# Patient Record
Sex: FEMALE | Race: WHITE | HISPANIC OR LATINO | URBAN - METROPOLITAN AREA
[De-identification: names, ages, dates, MRNs, and addresses within clinical notes are randomized per-mention and may not be internally consistent; named-entity substitution may affect disease eponyms.]

---

## 2018-10-11 ENCOUNTER — EMERGENCY (EMERGENCY)
Facility: HOSPITAL | Age: 43
LOS: 1 days | End: 2018-10-11
Payer: COMMERCIAL

## 2018-10-11 PROCEDURE — 72125 CT NECK SPINE W/O DYE: CPT | Mod: 26

## 2018-10-11 PROCEDURE — 73030 X-RAY EXAM OF SHOULDER: CPT | Mod: 26,RT

## 2018-10-11 PROCEDURE — 99284 EMERGENCY DEPT VISIT MOD MDM: CPT

## 2022-02-10 ENCOUNTER — EMERGENCY (EMERGENCY)
Facility: HOSPITAL | Age: 47
LOS: 1 days | Discharge: HOME | End: 2022-02-10
Admitting: EMERGENCY MEDICINE
Payer: COMMERCIAL

## 2022-02-10 DIAGNOSIS — R11.0 NAUSEA: ICD-10-CM

## 2022-02-10 DIAGNOSIS — R42 DIZZINESS AND GIDDINESS: ICD-10-CM

## 2022-02-10 DIAGNOSIS — M54.2 CERVICALGIA: ICD-10-CM

## 2022-02-10 PROCEDURE — 99284 EMERGENCY DEPT VISIT MOD MDM: CPT

## 2022-02-10 PROCEDURE — 93010 ELECTROCARDIOGRAM REPORT: CPT

## 2022-07-20 PROBLEM — Z00.00 ENCOUNTER FOR PREVENTIVE HEALTH EXAMINATION: Status: ACTIVE | Noted: 2022-07-20

## 2022-07-28 ENCOUNTER — NON-APPOINTMENT (OUTPATIENT)
Age: 47
End: 2022-07-28

## 2022-09-01 ENCOUNTER — TRANSCRIPTION ENCOUNTER (OUTPATIENT)
Age: 47
End: 2022-09-01

## 2022-09-01 ENCOUNTER — LABORATORY RESULT (OUTPATIENT)
Age: 47
End: 2022-09-01

## 2022-09-01 ENCOUNTER — NON-APPOINTMENT (OUTPATIENT)
Age: 47
End: 2022-09-01

## 2022-09-01 ENCOUNTER — APPOINTMENT (OUTPATIENT)
Dept: RHEUMATOLOGY | Facility: CLINIC | Age: 47
End: 2022-09-01

## 2022-09-01 VITALS
RESPIRATION RATE: 18 BRPM | SYSTOLIC BLOOD PRESSURE: 118 MMHG | WEIGHT: 125 LBS | BODY MASS INDEX: 24.54 KG/M2 | HEART RATE: 84 BPM | HEIGHT: 60 IN | DIASTOLIC BLOOD PRESSURE: 83 MMHG | OXYGEN SATURATION: 100 %

## 2022-09-01 PROCEDURE — 99204 OFFICE O/P NEW MOD 45 MIN: CPT | Mod: 25

## 2022-09-01 PROCEDURE — 36415 COLL VENOUS BLD VENIPUNCTURE: CPT

## 2022-09-01 NOTE — HISTORY OF PRESENT ILLNESS
[FreeTextEntry1] : 48 y/o female w/ Hashimoto's referred to rheumatology for evaluation for SLE in setting of joint pains and positive JAMEEL. \par Pt reports recurrent joint pains worst b/l shoulders, knees, hands. Reports some recurrent swelling of hands. Denies other joint swelling, redness, warmth.\par Reports photosensitivity with rashes of head and b/l arms. Pt has possible eczema of back.\par Reports fatigue over many years.\par Pt had history cipro allergy with subserquent joint pains. Pt was seen by Dr. Mckoy x 1 year and told she has mild lupus was on HCQ for 1 year (almost 10 years ago, thinks it helped) but when she was seen by a different rheumatologist, she was told she does not have lupus and taken off the medication.\par Reports recurrent painful oral ulcers recently.\par Cough since COVID infection. Reports generalized alopecia. Dry eyes, uses eye drops.\par \par Patient denies fever, chest pain, abdominal pain, cough, SOB, nausea, vomiting, diarrhea, blood in stool, hematuria, Raynaud's, dry mouth, numbness/tingling, miscarriages (3 children), Hx of DVT/PEs.\par ROS negative unless otherwise noted above.\par \par Maternal side - thyroid diseases\par \par WORKUP: \par Remarkable for (5/2022): WBC 3.4, JAMEEL 1:80 nuclear/speckled, RF 43, Vit D 25-OH 11 \par Normal/neg (5/2022): CMP, U/A, ESR, CRP, CCP, uric acid 3.4, B12, folate, TSH, HgbA1C, Lyme\par

## 2022-09-01 NOTE — ASSESSMENT
[FreeTextEntry1] : 46 y/o female w/ Hashimoto's referred to rheumatology for evaluation for SLE in setting of joint pains and positive JAMEEL. \par Pt reports recurrent joint pains worst b/l shoulders, knees, hands. Reports some recurrent swelling of hands. Denies other joint swelling, redness, warmth.\par Reports photosensitivity with rashes of head and b/l arms. Pt has possible eczema of back.\par Reports fatigue over many years.\par Pt had history cipro allergy with subserquent joint pains. Pt was seen by Dr. Mckoy x 1 year and told she has mild lupus was on HCQ for 1 year (almost 10 years ago, thinks it helped) but when she was seen by a different rheumatologist, she was told she does not have lupus and taken off the medication.\par Reports recurrent painful oral ulcers recently.\par Cough since COVID infection. Reports generalized alopecia. Dry eyes, uses eye drops.\par Maternal side - thyroid diseases\par Labwork by PCP in 5/2022 with WBC 3.4, JAMEEL 1:80 nuclear/speckled, RF 43, low Vit D (supplemented). Negative inflammatory markers, CCP\par \par Patient has minimal positive JAMEEL (in setting of Hashimoto's), minimal leukopenia, elevated RF, with joint pains (possible inflammatory character), photosensitive rashes, recurrent oral ulcers.\par Although JAMEEL can be explained by Hashimoto's, pt's constellation of symptoms would meet criteria for SLE. Pt likely has mild lupus (only joint and skin involvement for ~10 years).\par Discussed with patient that lupus treatment is based on severity and level of organ involvement. Based on pt's mild lupus, pt would likely benefit from restarting HCQ. More aggressive treatment would only be indicated if there are more serious manifestations of SLE.\par \par - Obtain labwork to evaluate and characterize SLE, medication safety\par - After results, will likely start HCQ 200mg PO daily. HCQ dosage is <5mg/kg/day or <400mg/day to minimize risk of retinal toxicity.\par Side effects of HCQ were discussed with the patient including but not limited to GI upset, retinal toxicity, QT prolongation, cytopenias, myopathy. Discussed the importance of yearly ophthalmology evaluation.\par - Refer to ophthalmology for HCQ follow ups\par - Will call pt with results to likely start HCQ. RTC 2.5 months.\par \par \par

## 2022-09-02 LAB
ALBUMIN SERPL ELPH-MCNC: 4.7 G/DL
ALP BLD-CCNC: 84 U/L
ALT SERPL-CCNC: 10 U/L
ANION GAP SERPL CALC-SCNC: 12 MMOL/L
APPEARANCE: ABNORMAL
AST SERPL-CCNC: 20 U/L
BASOPHILS # BLD AUTO: 0.06 K/UL
BASOPHILS NFR BLD AUTO: 1.4 %
BILIRUB SERPL-MCNC: 0.3 MG/DL
BILIRUBIN URINE: NEGATIVE
BLOOD URINE: NEGATIVE
BUN SERPL-MCNC: 9 MG/DL
C3 SERPL-MCNC: 131 MG/DL
C4 SERPL-MCNC: 19 MG/DL
CALCIUM SERPL-MCNC: 9.3 MG/DL
CHLORIDE SERPL-SCNC: 105 MMOL/L
CK SERPL-CCNC: 55 U/L
CO2 SERPL-SCNC: 22 MMOL/L
COLOR: YELLOW
CREAT SERPL-MCNC: 0.68 MG/DL
CREAT SPEC-SCNC: 216 MG/DL
CREAT/PROT UR: 0.1 RATIO
CRP SERPL-MCNC: <3 MG/L
EGFR: 109 ML/MIN/1.73M2
ENA RNP AB SER IA-ACNC: <0.2 AL
ENA SM AB SER IA-ACNC: <0.2 AL
ENA SS-A AB SER IA-ACNC: <0.2 AL
ENA SS-B AB SER IA-ACNC: <0.2 AL
EOSINOPHIL # BLD AUTO: 0.15 K/UL
EOSINOPHIL NFR BLD AUTO: 3.6 %
ERYTHROCYTE [SEDIMENTATION RATE] IN BLOOD BY WESTERGREN METHOD: 6 MM/HR
GLUCOSE QUALITATIVE U: NEGATIVE
GLUCOSE SERPL-MCNC: 91 MG/DL
HBV CORE IGG+IGM SER QL: NONREACTIVE
HBV SURFACE AB SER QL: REACTIVE
HBV SURFACE AG SER QL: NONREACTIVE
HCT VFR BLD CALC: 40.9 %
HCV AB SER QL: NONREACTIVE
HCV S/CO RATIO: 0.11 S/CO
HGB BLD-MCNC: 13.6 G/DL
IMM GRANULOCYTES NFR BLD AUTO: 0.2 %
KETONES URINE: NEGATIVE
LEUKOCYTE ESTERASE URINE: NEGATIVE
LYMPHOCYTES # BLD AUTO: 0.99 K/UL
LYMPHOCYTES NFR BLD AUTO: 23.6 %
MAN DIFF?: NORMAL
MCHC RBC-ENTMCNC: 30 PG
MCHC RBC-ENTMCNC: 33.3 GM/DL
MCV RBC AUTO: 90.1 FL
MONOCYTES # BLD AUTO: 0.33 K/UL
MONOCYTES NFR BLD AUTO: 7.9 %
NEUTROPHILS # BLD AUTO: 2.65 K/UL
NEUTROPHILS NFR BLD AUTO: 63.3 %
NITRITE URINE: NEGATIVE
PH URINE: 6
PLATELET # BLD AUTO: 236 K/UL
POTASSIUM SERPL-SCNC: 4.3 MMOL/L
PROT SERPL-MCNC: 7.5 G/DL
PROT UR-MCNC: 15 MG/DL
PROTEIN URINE: NORMAL
RBC # BLD: 4.54 M/UL
RBC # FLD: 13.5 %
SODIUM SERPL-SCNC: 138 MMOL/L
SPECIFIC GRAVITY URINE: 1.02
UROBILINOGEN URINE: NORMAL
WBC # FLD AUTO: 4.19 K/UL

## 2022-09-04 LAB
DSDNA AB SER-ACNC: 13 IU/ML
THYROGLOB AB SERPL-ACNC: <20 IU/ML
THYROPEROXIDASE AB SERPL IA-ACNC: 1147 IU/ML

## 2022-09-06 LAB
ANA PAT FLD IF-IMP: ABNORMAL
ANA SER IF-ACNC: ABNORMAL
M TB IFN-G BLD-IMP: NEGATIVE
QUANTIFERON TB PLUS MITOGEN MINUS NIL: >10 IU/ML
QUANTIFERON TB PLUS NIL: 0.01 IU/ML
QUANTIFERON TB PLUS TB1 MINUS NIL: 0 IU/ML
QUANTIFERON TB PLUS TB2 MINUS NIL: 0 IU/ML

## 2022-09-12 LAB — 14-3-3 ETA AG SER IA-MCNC: <0.2 NG/ML

## 2022-10-17 ENCOUNTER — NON-APPOINTMENT (OUTPATIENT)
Age: 47
End: 2022-10-17

## 2022-10-19 ENCOUNTER — NON-APPOINTMENT (OUTPATIENT)
Age: 47
End: 2022-10-19

## 2022-10-19 ENCOUNTER — APPOINTMENT (OUTPATIENT)
Dept: OPHTHALMOLOGY | Facility: CLINIC | Age: 47
End: 2022-10-19

## 2022-10-19 PROCEDURE — 92015 DETERMINE REFRACTIVE STATE: CPT

## 2022-10-19 PROCEDURE — 76514 ECHO EXAM OF EYE THICKNESS: CPT

## 2022-10-19 PROCEDURE — 92134 CPTRZ OPH DX IMG PST SGM RTA: CPT

## 2022-10-19 PROCEDURE — 92004 COMPRE OPH EXAM NEW PT 1/>: CPT

## 2022-11-02 ENCOUNTER — APPOINTMENT (OUTPATIENT)
Dept: RHEUMATOLOGY | Facility: CLINIC | Age: 47
End: 2022-11-02

## 2022-11-02 VITALS
HEART RATE: 83 BPM | SYSTOLIC BLOOD PRESSURE: 127 MMHG | OXYGEN SATURATION: 100 % | HEIGHT: 60 IN | BODY MASS INDEX: 24.74 KG/M2 | WEIGHT: 126 LBS | DIASTOLIC BLOOD PRESSURE: 84 MMHG

## 2022-11-02 PROCEDURE — 99214 OFFICE O/P EST MOD 30 MIN: CPT

## 2022-11-02 NOTE — ASSESSMENT
[FreeTextEntry1] : 48 y/o female w/ Hashimoto's presents as follow up for SLE.\par Pt reports recurrent joint pains worst b/l shoulders, knees, hands. Reports some recurrent swelling of hands. Denies other joint swelling, redness, warmth. \par Reports photosensitivity with rashes of head and b/l arms. Pt has possible eczema of back. \par Reports fatigue over many years. \par Pt had history cipro allergy with subserquent joint pains. Pt was seen by Dr. Mckoy x 1 year and told she has mild lupus was on HCQ for 1 year (almost 10 years ago, thinks it helped) but when she was seen by a different rheumatologist, she was told she does not have lupus and taken off the medication. \par Reports recurrent painful oral ulcers recently. \par Cough since COVID infection. Reports generalized alopecia. Dry eyes, uses eye drops. \par Maternal side - thyroid diseases \par \par Labwork with positive JAMEEL, inflammatory arthritis, oral ulcers, photosensitive rash, Hx of mild leukopenia – pt meets criteria for SLE without organ involvement. Pt also has TPO Ab+ in setting of know Hashimoto’s. Pt was started on HCQ by me on 9/2022 with improvement in joint pains. Pt also takes celebrex PRN for pain with good improvement.\par \par - Will obtain lupus activity labwork every ~6 months\par - c/w HCQ 200mg PO daily. HCQ dosage is <5mg/kg/day or <400mg/day to minimize risk of retinal toxicity. \par Side effects of HCQ were discussed with the patient including but not limited to GI upset, retinal toxicity, QT prolongation, cytopenias, myopathy. Discussed the importance of yearly ophthalmology evaluation. \par - Last seen by ophthalmology 10/2022\par - RTC 3 months for labwork, then every 6 months if stable. \par

## 2022-11-02 NOTE — HISTORY OF PRESENT ILLNESS
[FreeTextEntry1] : HISTORY: \par 46 y/o female w/ Hashimoto's presents as follow up for SLE.\par Pt reports recurrent joint pains worst b/l shoulders, knees, hands. Reports some recurrent swelling of hands. Denies other joint swelling, redness, warmth. \par Reports photosensitivity with rashes of head and b/l arms. Pt has possible eczema of back. \par Reports fatigue over many years. \par Pt had history cipro allergy with subserquent joint pains. Pt was seen by Dr. Mckoy x 1 year and told she has mild lupus was on HCQ for 1 year (almost 10 years ago, thinks it helped) but when she was seen by a different rheumatologist, she was told she does not have lupus and taken off the medication. \par Reports recurrent painful oral ulcers recently. \par Cough since COVID infection. Reports generalized alopecia. Dry eyes, uses eye drops. \par Maternal side - thyroid diseases \par Labwork by PCP in 5/2022 with WBC 3.4, JAMEEL 1:80 nuclear/speckled, RF 43, low Vit D (supplemented). Negative inflammatory markers, CCP \par \par INTERVAL HISTORY: \par Labwork with positive JAMEEL, inflammatory arthritis, oral ulcers, photosensitive rash, Hx of mild leukopenia – pt meets criteria for SLE without organ involvement. Pt also has TPO Ab+ in setting of know Hashimoto’s. Pt was started on HCQ by me on 9/2022. \par Pt was last seen by ophthalmology for HCQ screening on 10/2022. Pt also had punctal plugs for dry eyes.\par Pt reports HCQ helps with joint pains overall and takes celebrex PRN for the pains with good improvement in pains.\par Denies any side effects.\par Reports continuing occasional oral ulcers and possible nasal ulcers.\par \par WORKUP:  \par Remarkable for (5/2022 - 9/2022): JAMEEL 1:320 homogeneous, TPO Ab+, RF 43 \par Normal/neg (5/2022 - 9/2022): CBC, CMP, U/A, UPCR, ESR, CRP, dsDNA, SSA, SSB, Smith, RNP, C3, C4, APLS labs, CCP, 14.3.3 eta protein, uric acid 3.4, CPK, B12, folate, TSH, HgbA1C, Lyme, Hep B/C, Quantiferon TB\par

## 2023-02-02 ENCOUNTER — LABORATORY RESULT (OUTPATIENT)
Age: 48
End: 2023-02-02

## 2023-02-02 ENCOUNTER — APPOINTMENT (OUTPATIENT)
Dept: RHEUMATOLOGY | Facility: CLINIC | Age: 48
End: 2023-02-02
Payer: COMMERCIAL

## 2023-02-02 VITALS
OXYGEN SATURATION: 100 % | RESPIRATION RATE: 18 BRPM | TEMPERATURE: 97.2 F | SYSTOLIC BLOOD PRESSURE: 113 MMHG | DIASTOLIC BLOOD PRESSURE: 78 MMHG | HEART RATE: 92 BPM

## 2023-02-02 DIAGNOSIS — K13.79 OTHER LESIONS OF ORAL MUCOSA: ICD-10-CM

## 2023-02-02 PROCEDURE — 99214 OFFICE O/P EST MOD 30 MIN: CPT | Mod: 25

## 2023-02-02 PROCEDURE — 36415 COLL VENOUS BLD VENIPUNCTURE: CPT

## 2023-02-02 NOTE — HISTORY OF PRESENT ILLNESS
[FreeTextEntry1] : HISTORY: \par 48 y/o female w/ Hashimoto's presents as follow up for SLE. \par Pt reports recurrent joint pains worst b/l shoulders, knees, hands. Reports some recurrent swelling of hands. Denies other joint swelling, redness, warmth.  \par Reports photosensitivity with rashes of head and b/l arms. Pt has possible eczema of back.  \par Reports fatigue over many years.  \par Pt had history cipro allergy with subsequent joint pains. Pt was seen by Dr. Mckoy x 1 year and told she has mild lupus was on HCQ for 1 year (almost 10 years ago, thinks it helped) but when she was seen by a different rheumatologist, she was told she does not have lupus and taken off the medication.  \par Reports recurrent painful oral ulcers recently.  \par Cough since COVID infection. Reports generalized alopecia. Dry eyes, uses eye drops.  \par Maternal side - thyroid diseases  \par \par Labwork with positive JAMEEL, inflammatory arthritis, oral ulcers, photosensitive rash, Hx of mild leukopenia – pt meets criteria for SLE without organ involvement. Pt also has TPO Ab+ in setting of known Hashimoto’s. Pt was started on HCQ by me on 9/2022 with improvement in joint pains. Pt also takes celebrex PRN for pain with good improvement. \par \par INTERVAL HISTORY: \par Pt has felt generally well - pt has been having more significant joint pains over the last 2 weeks for which she takes celebrex PRN. Pt continues to have recurrent oral ulcers that have improved since starting HCQ.\par Denies other concerns today.\par \par WORKUP:  \par Remarkable for (5/2022 - 9/2022): JAMEEL 1:320 homogeneous, TPO Ab+, RF 43  \par Normal/neg (5/2022 - 9/2022): CBC, CMP, U/A, UPCR, ESR, CRP, dsDNA, SSA, SSB, Smith, RNP, C3, C4, APLS labs, CCP, 14.3.3 eta protein, uric acid 3.4, CPK, B12, folate, TSH, HgbA1C, Lyme, Hep B/C, Quantiferon TB

## 2023-02-02 NOTE — ASSESSMENT
[FreeTextEntry1] : 46 y/o female w/ Hashimoto's presents as follow up for SLE. \par Pt reports recurrent joint pains worst b/l shoulders, knees, hands. Reports some recurrent swelling of hands. Denies other joint swelling, redness, warmth.  \par Reports photosensitivity with rashes of head and b/l arms. Pt has possible eczema of back.  \par Reports fatigue over many years.  \par Pt had history cipro allergy with subsequent joint pains. Pt was seen by Dr. Mckoy x 1 year and told she has mild lupus was on HCQ for 1 year (almost 10 years ago, thinks it helped) but when she was seen by a different rheumatologist, she was told she does not have lupus and taken off the medication.  \par Reports recurrent painful oral ulcers recently.  \par Cough since COVID infection. Reports generalized alopecia. Dry eyes, uses eye drops.  \par Maternal side - thyroid diseases  \par \par Labwork with positive JAMEEL, inflammatory arthritis, oral ulcers, photosensitive rash, Hx of mild leukopenia – pt meets criteria for SLE without organ involvement. Pt also has TPO Ab+ in setting of known Hashimoto’s. Pt was started on HCQ by me on 9/2022 with improvement in joint pains. Pt also takes celebrex PRN for pain with good improvement. \par \par - Will obtain lupus activity labwork (will obtain every ~6 months or earlier if symptoms)\par - c/w HCQ 200mg PO daily. HCQ dosage is <5mg/kg/day or <400mg/day to minimize risk of retinal toxicity.  \par Side effects of HCQ were discussed with the patient including but not limited to GI upset, retinal toxicity, QT prolongation, cytopenias, myopathy. Discussed the importance of yearly ophthalmology evaluation.  \par - c/w celebrex 200mg PO BID PRN. I advised that the NSAID should be taken with food.  In addition while taking the prescribed NSAID, no over the counter or other NSAIDs should be used, such as ibuprofen (Motrin or Advil) or naproxen (Aleve) as this can cause stomach upset or other side effects.  If needed for fever or breakthrough pain Tylenol can be used.\par - Last seen by ophthalmology 10/2022 \par - RTC 6 months for follow up\par

## 2023-02-03 LAB
ALBUMIN SERPL ELPH-MCNC: 4.8 G/DL
ALP BLD-CCNC: 79 U/L
ALT SERPL-CCNC: 12 U/L
ANION GAP SERPL CALC-SCNC: 10 MMOL/L
APPEARANCE: ABNORMAL
AST SERPL-CCNC: 15 U/L
BASOPHILS # BLD AUTO: 0.05 K/UL
BASOPHILS NFR BLD AUTO: 1.6 %
BILIRUB SERPL-MCNC: 0.5 MG/DL
BILIRUBIN URINE: NEGATIVE
BLOOD URINE: NEGATIVE
BUN SERPL-MCNC: 8 MG/DL
C3 SERPL-MCNC: 139 MG/DL
C4 SERPL-MCNC: 22 MG/DL
CALCIUM SERPL-MCNC: 9.9 MG/DL
CHLORIDE SERPL-SCNC: 103 MMOL/L
CO2 SERPL-SCNC: 25 MMOL/L
COLOR: YELLOW
CREAT SERPL-MCNC: 0.78 MG/DL
CREAT SPEC-SCNC: 226 MG/DL
CREAT/PROT UR: 0.1 RATIO
CRP SERPL-MCNC: <3 MG/L
DSDNA AB SER-ACNC: <12 IU/ML
EGFR: 95 ML/MIN/1.73M2
EOSINOPHIL # BLD AUTO: 0.15 K/UL
EOSINOPHIL NFR BLD AUTO: 4.7 %
ERYTHROCYTE [SEDIMENTATION RATE] IN BLOOD BY WESTERGREN METHOD: 5 MM/HR
GLUCOSE QUALITATIVE U: NEGATIVE
GLUCOSE SERPL-MCNC: 91 MG/DL
HCT VFR BLD CALC: 40.7 %
HGB BLD-MCNC: 13.9 G/DL
IMM GRANULOCYTES NFR BLD AUTO: 0.3 %
KETONES URINE: NEGATIVE
LEUKOCYTE ESTERASE URINE: NEGATIVE
LYMPHOCYTES # BLD AUTO: 0.93 K/UL
LYMPHOCYTES NFR BLD AUTO: 29.1 %
MAN DIFF?: NORMAL
MCHC RBC-ENTMCNC: 30 PG
MCHC RBC-ENTMCNC: 34.2 GM/DL
MCV RBC AUTO: 87.7 FL
MONOCYTES # BLD AUTO: 0.41 K/UL
MONOCYTES NFR BLD AUTO: 12.8 %
NEUTROPHILS # BLD AUTO: 1.65 K/UL
NEUTROPHILS NFR BLD AUTO: 51.5 %
NITRITE URINE: NEGATIVE
PH URINE: 6
PLATELET # BLD AUTO: 215 K/UL
POTASSIUM SERPL-SCNC: 4.2 MMOL/L
PROT SERPL-MCNC: 7.9 G/DL
PROT UR-MCNC: 13 MG/DL
PROTEIN URINE: ABNORMAL
RBC # BLD: 4.64 M/UL
RBC # FLD: 12.3 %
SODIUM SERPL-SCNC: 138 MMOL/L
SPECIFIC GRAVITY URINE: 1.03
UROBILINOGEN URINE: NORMAL
WBC # FLD AUTO: 3.2 K/UL

## 2023-05-24 ENCOUNTER — NON-APPOINTMENT (OUTPATIENT)
Age: 48
End: 2023-05-24

## 2023-05-24 ENCOUNTER — APPOINTMENT (OUTPATIENT)
Dept: OPHTHALMOLOGY | Facility: CLINIC | Age: 48
End: 2023-05-24
Payer: COMMERCIAL

## 2023-05-24 PROCEDURE — 68761 CLOSE TEAR DUCT OPENING: CPT | Mod: E2,E4

## 2023-05-24 PROCEDURE — 92083 EXTENDED VISUAL FIELD XM: CPT

## 2023-05-24 PROCEDURE — 92012 INTRM OPH EXAM EST PATIENT: CPT | Mod: 25

## 2023-05-30 RX ORDER — CELECOXIB 200 MG/1
200 CAPSULE ORAL TWICE DAILY
Qty: 180 | Refills: 1 | Status: ACTIVE | COMMUNITY
Start: 2022-09-06

## 2023-08-14 ENCOUNTER — APPOINTMENT (OUTPATIENT)
Dept: RHEUMATOLOGY | Facility: CLINIC | Age: 48
End: 2023-08-14
Payer: COMMERCIAL

## 2023-08-14 VITALS
WEIGHT: 126 LBS | OXYGEN SATURATION: 100 % | HEART RATE: 102 BPM | HEIGHT: 60 IN | TEMPERATURE: 97.8 F | DIASTOLIC BLOOD PRESSURE: 78 MMHG | BODY MASS INDEX: 24.74 KG/M2 | SYSTOLIC BLOOD PRESSURE: 108 MMHG

## 2023-08-14 DIAGNOSIS — R76.8 OTHER SPECIFIED ABNORMAL IMMUNOLOGICAL FINDINGS IN SERUM: ICD-10-CM

## 2023-08-14 PROCEDURE — 36415 COLL VENOUS BLD VENIPUNCTURE: CPT

## 2023-08-14 PROCEDURE — 99214 OFFICE O/P EST MOD 30 MIN: CPT | Mod: 25

## 2023-08-14 RX ORDER — PREDNISONE 20 MG/1
20 TABLET ORAL
Qty: 30 | Refills: 0 | Status: COMPLETED | COMMUNITY
Start: 2023-03-10 | End: 2023-08-14

## 2023-08-14 NOTE — PHYSICAL EXAM
[TextEntry] : GENERAL: Appears in no acute distress HEENT: EOMI, PERRLA. No conjunctival erythema. Moist mucous membranes. No nasopharyngeal ulcers NECK: Supple, no cervical lymphadenopathy, no thyromegaly CARDIOVASCULAR: RRR. S1, S2 auscultated. No murmurs or rubs. PULMONARY: Clear to auscultation b/l, no wheezes, rales, or crackles ABDOMINAL: Soft, nontender, nondistended. Bowel sounds present. No organomegaly. MSK: No active synovitis, swelling, erythema, or warmth. No joint tenderness to palpation. No deformities. Normal ROM of neck, back, b/l upper and lower extremities. SKIN: No lesions or rashes NEURO: No focal deficits. PSYCH: AAOx3. Normal affect and thought process.

## 2023-08-14 NOTE — HISTORY OF PRESENT ILLNESS
[FreeTextEntry1] : HISTORY:  47 y/o female w/ Hashimoto's presents as follow up for SLE.  Pt reports recurrent joint pains worst b/l shoulders, knees, hands. Reports some recurrent swelling of hands. Denies other joint swelling, redness, warmth.  Reports photosensitivity with rashes of head and b/l arms. Pt has possible eczema of back.  Reports fatigue over many years.  Pt had history cipro allergy with subsequent joint pains. Pt was seen by Dr. Mckoy x 1 year and told she has mild lupus was on HCQ for 1 year (almost 10 years ago, thinks it helped) but when she was seen by a different rheumatologist, she was told she does not have lupus and taken off the medication.  Reports recurrent painful oral ulcers recently.  Cough since COVID infection. Reports generalized alopecia. Dry eyes, uses eye drops.  Maternal side - thyroid diseases   Labwork with positive JAMEEL, inflammatory arthritis, oral ulcers, photosensitive rash, Hx of mild leukopenia - pt meets criteria for SLE without organ involvement. Pt also has TPO Ab+ in setting of known Hashimoto's. Pt was started on HCQ by me on 9/2022 with improvement in joint pains. Pt also takes celebrex PRN for pain with good improvement.   INTERVAL HISTORY:  Since last visit, pt has been started on medication for migraines. Pt continues to have better control of her joint pains, oral ulcers, rash with HCQ. Last seen by ophthalmology in 5/2023.  WORKUP:  Remarkable for (5/2022 - 9/2022): JAMEEL 1:320 homogeneous, TPO Ab+, RF 43  Normal/neg (5/2022 - 9/2022): CBC, CMP, U/A, UPCR, ESR, CRP, dsDNA, SSA, SSB, Smith, RNP, C3, C4, APLS labs, CCP, 14.3.3 eta protein, uric acid 3.4, CPK, B12, folate, TSH, HgbA1C, Lyme, Hep B/C, Quantiferon TB

## 2023-08-14 NOTE — ASSESSMENT
[FreeTextEntry1] : HISTORY:  49 y/o female w/ Hashimoto's presents as follow up for SLE.  Pt reports recurrent joint pains worst b/l shoulders, knees, hands. Reports some recurrent swelling of hands. Denies other joint swelling, redness, warmth.  Reports photosensitivity with rashes of head and b/l arms. Pt has possible eczema of back.  Reports fatigue over many years.  Pt had history cipro allergy with subsequent joint pains. Pt was seen by Dr. Mckoy x 1 year and told she has mild lupus was on HCQ for 1 year (almost 10 years ago, thinks it helped) but when she was seen by a different rheumatologist, she was told she does not have lupus and taken off the medication.  Reports recurrent painful oral ulcers recently.  Cough since COVID infection. Reports generalized alopecia. Dry eyes, uses eye drops.  Maternal side - thyroid diseases   Labwork with positive JAMEEL, inflammatory arthritis, oral ulcers, photosensitive rash, Hx of mild leukopenia - pt meets criteria for SLE without organ involvement. Pt also has TPO Ab+ in setting of known Hashimoto's. Pt was started on HCQ by me on 9/2022 with improvement in joint pains. Pt also takes celebrex PRN for pain with good improvement.   Last seen by ophthalmology in 5/2023.  - Will obtain lupus activity labwork (will obtain every ~6 months or earlier if symptoms)  - c/w HCQ 200mg PO daily. HCQ dosage is <5mg/kg/day or <400mg/day to minimize risk of retinal toxicity.  Side effects of HCQ were discussed with the patient including but not limited to GI upset, retinal toxicity, QT prolongation, cytopenias, myopathy. Discussed the importance of yearly ophthalmology evaluation.  - c/w celebrex 200mg PO BID PRN. I advised that the NSAID should be taken with food. In addition while taking the prescribed NSAID, no over the counter or other NSAIDs should be used, such as ibuprofen (Motrin or Advil) or naproxen (Aleve) as this can cause stomach upset or other side effects. If needed for fever or breakthrough pain Tylenol can be used.  - Last seen by ophthalmology 5/2023  - RTC 6 months for follow up

## 2023-08-15 LAB
ALBUMIN SERPL ELPH-MCNC: 4.8 G/DL
ALP BLD-CCNC: 77 U/L
ALT SERPL-CCNC: 8 U/L
ANION GAP SERPL CALC-SCNC: 14 MMOL/L
APPEARANCE: CLEAR
AST SERPL-CCNC: 14 U/L
BILIRUB SERPL-MCNC: 0.4 MG/DL
BILIRUBIN URINE: NEGATIVE
BLOOD URINE: NEGATIVE
BUN SERPL-MCNC: 12 MG/DL
C3 SERPL-MCNC: 128 MG/DL
C4 SERPL-MCNC: 16 MG/DL
CALCIUM SERPL-MCNC: 10 MG/DL
CHLORIDE SERPL-SCNC: 105 MMOL/L
CO2 SERPL-SCNC: 21 MMOL/L
COLOR: YELLOW
CREAT SERPL-MCNC: 0.71 MG/DL
CREAT SPEC-SCNC: 82 MG/DL
CREAT/PROT UR: 0.1 RATIO
CRP SERPL-MCNC: <3 MG/L
DSDNA AB SER-ACNC: <12 IU/ML
EGFR: 106 ML/MIN/1.73M2
ERYTHROCYTE [SEDIMENTATION RATE] IN BLOOD BY WESTERGREN METHOD: 2 MM/HR
GLUCOSE QUALITATIVE U: NEGATIVE MG/DL
GLUCOSE SERPL-MCNC: 84 MG/DL
KETONES URINE: NEGATIVE MG/DL
LEUKOCYTE ESTERASE URINE: NEGATIVE
NITRITE URINE: NEGATIVE
PH URINE: 5.5
POTASSIUM SERPL-SCNC: 4.1 MMOL/L
PROT SERPL-MCNC: 7.7 G/DL
PROT UR-MCNC: 4 MG/DL
PROTEIN URINE: NEGATIVE MG/DL
SODIUM SERPL-SCNC: 140 MMOL/L
SPECIFIC GRAVITY URINE: 1.01
UROBILINOGEN URINE: 0.2 MG/DL

## 2023-09-27 ENCOUNTER — NON-APPOINTMENT (OUTPATIENT)
Age: 48
End: 2023-09-27

## 2023-09-27 ENCOUNTER — APPOINTMENT (OUTPATIENT)
Dept: OPHTHALMOLOGY | Facility: CLINIC | Age: 48
End: 2023-09-27
Payer: COMMERCIAL

## 2023-09-27 PROCEDURE — 92012 INTRM OPH EXAM EST PATIENT: CPT | Mod: 25

## 2023-09-27 PROCEDURE — 68761 CLOSE TEAR DUCT OPENING: CPT | Mod: E2,E4

## 2024-03-12 ENCOUNTER — APPOINTMENT (OUTPATIENT)
Dept: RHEUMATOLOGY | Facility: CLINIC | Age: 49
End: 2024-03-12
Payer: COMMERCIAL

## 2024-03-12 DIAGNOSIS — M25.50 PAIN IN UNSPECIFIED JOINT: ICD-10-CM

## 2024-03-12 DIAGNOSIS — M32.9 SYSTEMIC LUPUS ERYTHEMATOSUS, UNSPECIFIED: ICD-10-CM

## 2024-03-12 DIAGNOSIS — Z79.899 OTHER LONG TERM (CURRENT) DRUG THERAPY: ICD-10-CM

## 2024-03-12 PROCEDURE — G2211 COMPLEX E/M VISIT ADD ON: CPT

## 2024-03-12 PROCEDURE — 99214 OFFICE O/P EST MOD 30 MIN: CPT

## 2024-03-12 PROCEDURE — 36415 COLL VENOUS BLD VENIPUNCTURE: CPT

## 2024-03-12 RX ORDER — HYDROXYCHLOROQUINE SULFATE 200 MG/1
200 TABLET, FILM COATED ORAL
Qty: 90 | Refills: 1 | Status: ACTIVE | COMMUNITY
Start: 2022-09-06 | End: 1900-01-01

## 2024-03-12 RX ORDER — PREDNISONE 20 MG/1
20 TABLET ORAL
Qty: 30 | Refills: 0 | Status: ACTIVE | COMMUNITY
Start: 2023-11-22 | End: 1900-01-01

## 2024-03-12 NOTE — ASSESSMENT
[FreeTextEntry1] : HISTORY:  49 y/o female w/ Hashimoto's presents as follow up for SLE.  Pt reports recurrent joint pains worst b/l shoulders, knees, hands. Reports some recurrent swelling of hands. Denies other joint swelling, redness, warmth.  Reports photosensitivity with rashes of head and b/l arms. Pt has possible eczema of back.  Reports fatigue over many years.  Pt had history cipro allergy with subsequent joint pains. Pt was seen by Dr. Mckoy x 1 year and told she has mild lupus was on HCQ for 1 year (almost 10 years ago, thinks it helped) but when she was seen by a different rheumatologist, she was told she does not have lupus and taken off the medication.  Reports recurrent painful oral ulcers recently.  Cough since COVID infection. Reports generalized alopecia. Dry eyes, uses eye drops.  Maternal side - thyroid diseases   Labwork with positive JAMEEL, inflammatory arthritis, oral ulcers, photosensitive rash, Hx of mild leukopenia - pt meets criteria for SLE without organ involvement. Pt also has TPO Ab+ in setting of known Hashimoto's. Pt was started on HCQ by me on 9/2022 with improvement in joint pains. Pt also takes celebrex PRN for pain with good improvement.  Pt states she has episodes of joint pains in b/l hands and knees and takes PDN 20mg 1-2x/month with good improvement. Pt was last seen by ophthalmology in 9/2023 and had punctal plugs placed for dry eyes.  - Will obtain lupus activity labwork (will obtain every ~6 months or earlier if symptoms)  - c/w HCQ 200mg PO daily. HCQ dosage is <5mg/kg/day or <400mg/day to minimize risk of retinal toxicity.  Side effects of HCQ were discussed with the patient including but not limited to GI upset, retinal toxicity, QT prolongation, cytopenias, myopathy. Discussed the importance of yearly ophthalmology evaluation.  - c/w celebrex 200mg PO BID PRN. I advised that the NSAID should be taken with food. In addition while taking the prescribed NSAID, no over the counter or other NSAIDs should be used, such as ibuprofen (Motrin or Advil) or naproxen (Aleve) as this can cause stomach upset or other side effects. If needed for fever or breakthrough pain Tylenol can be used.  - Last seen by ophthalmology 9/2023  - RTC 6 months for follow up.

## 2024-03-12 NOTE — HISTORY OF PRESENT ILLNESS
[FreeTextEntry1] : HISTORY:  49 y/o female w/ Hashimoto's presents as follow up for SLE.  Pt reports recurrent joint pains worst b/l shoulders, knees, hands. Reports some recurrent swelling of hands. Denies other joint swelling, redness, warmth.  Reports photosensitivity with rashes of head and b/l arms. Pt has possible eczema of back.  Reports fatigue over many years.  Pt had history cipro allergy with subsequent joint pains. Pt was seen by Dr. Mckoy x 1 year and told she has mild lupus was on HCQ for 1 year (almost 10 years ago, thinks it helped) but when she was seen by a different rheumatologist, she was told she does not have lupus and taken off the medication.  Reports recurrent painful oral ulcers recently.  Cough since COVID infection. Reports generalized alopecia. Dry eyes, uses eye drops.  Maternal side - thyroid diseases   Labwork with positive JAMEEL, inflammatory arthritis, oral ulcers, photosensitive rash, Hx of mild leukopenia - pt meets criteria for SLE without organ involvement. Pt also has TPO Ab+ in setting of known Hashimoto's. Pt was started on HCQ by me on 9/2022 with improvement in joint pains. Pt also takes celebrex PRN for pain with good improvement.   INTERVAL HISTORY:  Pt was last seen in 8/2023. Pt was last seen by ophthalmology in 9/2023 and had punctal plugs placed for dry eyes. Pt states she has episodes of joint pains in b/l hands and knees and takes PDN 20mg 1-2x/month with good improvement. Reports no flare of other symptoms including rash, oral ulcers.  WORKUP:  Remarkable for (5/2022 - 9/2022): JAMEEL 1:320 homogeneous, TPO Ab+, RF 43  Normal/neg (5/2022 - 9/2022): CBC, CMP, U/A, UPCR, ESR, CRP, dsDNA, SSA, SSB, Smith, RNP, C3, C4, APLS labs, CCP, 14.3.3 eta protein, uric acid 3.4, CPK, B12, folate, TSH, HgbA1C, Lyme, Hep B/C, Quantiferon TB

## 2024-03-13 LAB
ALBUMIN SERPL ELPH-MCNC: 4.6 G/DL
ALP BLD-CCNC: 76 U/L
ALT SERPL-CCNC: 9 U/L
ANION GAP SERPL CALC-SCNC: 12 MMOL/L
APPEARANCE: CLEAR
AST SERPL-CCNC: 14 U/L
BILIRUB SERPL-MCNC: 0.5 MG/DL
BILIRUBIN URINE: NEGATIVE
BLOOD URINE: NEGATIVE
BUN SERPL-MCNC: 12 MG/DL
C3 SERPL-MCNC: 135 MG/DL
C4 SERPL-MCNC: 20 MG/DL
CALCIUM SERPL-MCNC: 9.3 MG/DL
CHLORIDE SERPL-SCNC: 105 MMOL/L
CO2 SERPL-SCNC: 21 MMOL/L
COLOR: YELLOW
CREAT SERPL-MCNC: 0.77 MG/DL
CREAT SPEC-SCNC: 103 MG/DL
CREAT/PROT UR: 0.1 RATIO
CRP SERPL-MCNC: <3 MG/L
EGFR: 96 ML/MIN/1.73M2
ERYTHROCYTE [SEDIMENTATION RATE] IN BLOOD BY WESTERGREN METHOD: 3 MM/HR
GLUCOSE QUALITATIVE U: NEGATIVE MG/DL
GLUCOSE SERPL-MCNC: 87 MG/DL
HCT VFR BLD CALC: 40.5 %
HGB BLD-MCNC: 13.6 G/DL
KETONES URINE: NEGATIVE MG/DL
LEUKOCYTE ESTERASE URINE: NEGATIVE
MCHC RBC-ENTMCNC: 30 PG
MCHC RBC-ENTMCNC: 33.6 GM/DL
MCV RBC AUTO: 89.2 FL
NITRITE URINE: NEGATIVE
PH URINE: 5.5
PLATELET # BLD AUTO: 237 K/UL
POTASSIUM SERPL-SCNC: 4 MMOL/L
PROT SERPL-MCNC: 7.5 G/DL
PROT UR-MCNC: 7 MG/DL
PROTEIN URINE: NEGATIVE MG/DL
RBC # BLD: 4.54 M/UL
RBC # FLD: 12.1 %
SODIUM SERPL-SCNC: 138 MMOL/L
SPECIFIC GRAVITY URINE: 1.02
UROBILINOGEN URINE: 0.2 MG/DL
WBC # FLD AUTO: 3.73 K/UL

## 2024-03-14 LAB — DSDNA AB SER-ACNC: 12 IU/ML

## 2024-04-09 ENCOUNTER — APPOINTMENT (OUTPATIENT)
Dept: OPHTHALMOLOGY | Facility: CLINIC | Age: 49
End: 2024-04-09
Payer: COMMERCIAL

## 2024-04-09 ENCOUNTER — NON-APPOINTMENT (OUTPATIENT)
Age: 49
End: 2024-04-09

## 2024-04-09 PROCEDURE — 92012 INTRM OPH EXAM EST PATIENT: CPT | Mod: 25

## 2024-04-09 PROCEDURE — 68761 CLOSE TEAR DUCT OPENING: CPT | Mod: E2,E2

## 2024-05-22 ENCOUNTER — NON-APPOINTMENT (OUTPATIENT)
Age: 49
End: 2024-05-22

## 2024-05-22 ENCOUNTER — APPOINTMENT (OUTPATIENT)
Dept: OPHTHALMOLOGY | Facility: CLINIC | Age: 49
End: 2024-05-22
Payer: COMMERCIAL

## 2024-05-22 PROCEDURE — 92083 EXTENDED VISUAL FIELD XM: CPT

## 2024-05-22 PROCEDURE — 92134 CPTRZ OPH DX IMG PST SGM RTA: CPT

## 2024-05-22 PROCEDURE — 92014 COMPRE OPH EXAM EST PT 1/>: CPT

## 2024-05-22 PROCEDURE — 92283 EXTND COLOR VISION XM: CPT

## 2024-07-16 ENCOUNTER — APPOINTMENT (OUTPATIENT)
Dept: OPHTHALMOLOGY | Facility: CLINIC | Age: 49
End: 2024-07-16
Payer: COMMERCIAL

## 2024-07-16 ENCOUNTER — NON-APPOINTMENT (OUTPATIENT)
Age: 49
End: 2024-07-16

## 2024-07-16 PROCEDURE — 92012 INTRM OPH EXAM EST PATIENT: CPT | Mod: 25

## 2024-07-16 PROCEDURE — 68761 CLOSE TEAR DUCT OPENING: CPT | Mod: E2,E4

## 2024-09-06 ENCOUNTER — RX RENEWAL (OUTPATIENT)
Age: 49
End: 2024-09-06

## 2024-09-11 ENCOUNTER — LABORATORY RESULT (OUTPATIENT)
Age: 49
End: 2024-09-11

## 2024-09-11 ENCOUNTER — APPOINTMENT (OUTPATIENT)
Dept: RHEUMATOLOGY | Facility: CLINIC | Age: 49
End: 2024-09-11
Payer: COMMERCIAL

## 2024-09-11 VITALS
HEIGHT: 60 IN | HEART RATE: 92 BPM | WEIGHT: 130 LBS | BODY MASS INDEX: 25.52 KG/M2 | OXYGEN SATURATION: 100 % | DIASTOLIC BLOOD PRESSURE: 78 MMHG | SYSTOLIC BLOOD PRESSURE: 118 MMHG

## 2024-09-11 DIAGNOSIS — M32.9 SYSTEMIC LUPUS ERYTHEMATOSUS, UNSPECIFIED: ICD-10-CM

## 2024-09-11 DIAGNOSIS — M25.50 PAIN IN UNSPECIFIED JOINT: ICD-10-CM

## 2024-09-11 DIAGNOSIS — Z79.899 OTHER LONG TERM (CURRENT) DRUG THERAPY: ICD-10-CM

## 2024-09-11 PROCEDURE — 99214 OFFICE O/P EST MOD 30 MIN: CPT

## 2024-09-11 PROCEDURE — G2211 COMPLEX E/M VISIT ADD ON: CPT | Mod: NC

## 2024-09-11 NOTE — HISTORY OF PRESENT ILLNESS
[FreeTextEntry1] : HISTORY:  48 y/o female w/ Hashimoto's presents as follow up for SLE.  Pt reports recurrent joint pains worst b/l shoulders, knees, hands. Reports some recurrent swelling of hands. Denies other joint swelling, redness, warmth.  Reports photosensitivity with rashes of head and b/l arms. Pt has possible eczema of back.  Reports fatigue over many years.  Pt had history cipro allergy with subsequent joint pains. Pt was seen by Dr. Mckoy x 1 year and told she has mild lupus was on HCQ for 1 year (almost 10 years ago, thinks it helped) but when she was seen by a different rheumatologist, she was told she does not have lupus and taken off the medication.  Reports recurrent painful oral ulcers recently.  Cough since COVID infection. Reports generalized alopecia. Dry eyes, uses eye drops.  Maternal side - thyroid diseases   Labwork with positive JAMEEL, inflammatory arthritis, oral ulcers, photosensitive rash, Hx of mild leukopenia - pt meets criteria for SLE without organ involvement. Pt also has TPO Ab+ in setting of known Hashimoto's. Pt was started on HCQ by me on 9/2022 with improvement in joint pains. Pt also takes celebrex PRN for pain with good improvement.  Pt states she has episodes of joint pains in b/l hands and knees and takes PDN 20mg 1-2x/month with good improvement.  INTERVAL HSITORY:  Last labs 3/2024 without signs of SLE activity. Pt has been having more fatigue over the last few months. Pt still has joint pains flares for which she generally takes celebrex PRN and rarely takes PDN 20mg 1-2x/month. Denies any new symptoms or medical conditions. Pt has been started on Zafemy by gyn - advised no interactions or concerns from me about starting the medication in setting of SLE and her SLE medications.  WORKUP:  Remarkable for (5/2022 - 9/2022): JAMEEL 1:320 homogeneous, TPO Ab+, RF 43  Normal/neg (5/2022 - 9/2022): CBC, CMP, U/A, UPCR, ESR, CRP, dsDNA, SSA, SSB, Smith, RNP, C3, C4, APLS labs, CCP, 14.3.3 eta protein, uric acid 3.4, CPK, B12, folate, TSH, HgbA1C, Lyme, Hep B/C, Quantiferon TB

## 2024-09-11 NOTE — ASSESSMENT
[FreeTextEntry1] : 50 y/o female w/ Hashimoto's presents as follow up for SLE.  Pt reports recurrent joint pains worst b/l shoulders, knees, hands. Reports some recurrent swelling of hands. Denies other joint swelling, redness, warmth.  Reports photosensitivity with rashes of head and b/l arms. Pt has possible eczema of back.  Reports fatigue over many years.  Pt had history cipro allergy with subsequent joint pains. Pt was seen by Dr. Mckoy x 1 year and told she has mild lupus was on HCQ for 1 year (almost 10 years ago, thinks it helped) but when she was seen by a different rheumatologist, she was told she does not have lupus and taken off the medication.  Reports recurrent painful oral ulcers recently.  Cough since COVID infection. Reports generalized alopecia. Dry eyes, uses eye drops.  Maternal side - thyroid diseases   Labwork with positive JAMEEL, inflammatory arthritis, oral ulcers, photosensitive rash, Hx of mild leukopenia - pt meets criteria for SLE without organ involvement. Pt also has TPO Ab+ in setting of known Hashimoto's. Pt was started on HCQ by me on 9/2022 with improvement in joint pains. Pt also takes celebrex PRN for pain with good improvement.  Pt states she has episodes of joint pains in b/l hands and knees and takes PDN 20mg 1-2x/month with good improvement.  Pt has been having more fatigue over the last few months without signs of symptoms of SLE activity - likely unrelated. Pt has been started on Zafemy by gyn - advised no interactions or concerns from me about starting the medication in setting of SLE and her SLE medications.  - Obtain lupus activity labwork - c/w HCQ 200mg PO daily. HCQ dosage is <5mg/kg/day or <400mg/day to minimize risk of retinal toxicity.  Side effects of HCQ were discussed with the patient including but not limited to GI upset, retinal toxicity, QT prolongation, cytopenias, myopathy. Discussed the importance of yearly ophthalmology evaluation.  - c/w celebrex 200mg PO BID PRN. I advised that the NSAID should be taken with food. In addition while taking the prescribed NSAID, no over the counter or other NSAIDs should be used, such as ibuprofen (Motrin or Advil) or naproxen (Aleve) as this can cause stomach upset or other side effects. If needed for fever or breakthrough pain Tylenol can be used.  - Last seen by ophthalmology 7/2024 - RTC 1 year or earlier PRN for follow up.

## 2024-09-12 LAB
ALBUMIN SERPL ELPH-MCNC: 4.3 G/DL
ALP BLD-CCNC: 64 U/L
ALT SERPL-CCNC: 9 U/L
ANION GAP SERPL CALC-SCNC: 12 MMOL/L
APPEARANCE: CLEAR
AST SERPL-CCNC: 14 U/L
BILIRUB SERPL-MCNC: 0.2 MG/DL
BILIRUBIN URINE: NEGATIVE
BLOOD URINE: NEGATIVE
BUN SERPL-MCNC: 8 MG/DL
C3 SERPL-MCNC: 142 MG/DL
C4 SERPL-MCNC: 22 MG/DL
CALCIUM SERPL-MCNC: 9.4 MG/DL
CHLORIDE SERPL-SCNC: 104 MMOL/L
CO2 SERPL-SCNC: 22 MMOL/L
COLOR: YELLOW
CREAT SERPL-MCNC: 0.74 MG/DL
CREAT SPEC-SCNC: 214 MG/DL
CREAT/PROT UR: 0.1 RATIO
CRP SERPL-MCNC: <3 MG/L
EGFR: 100 ML/MIN/1.73M2
ERYTHROCYTE [SEDIMENTATION RATE] IN BLOOD BY WESTERGREN METHOD: 3 MM/HR
GLUCOSE QUALITATIVE U: NEGATIVE MG/DL
GLUCOSE SERPL-MCNC: 104 MG/DL
HCT VFR BLD CALC: 40.5 %
HGB BLD-MCNC: 13.5 G/DL
KETONES URINE: NEGATIVE MG/DL
LEUKOCYTE ESTERASE URINE: NEGATIVE
MCHC RBC-ENTMCNC: 30.1 PG
MCHC RBC-ENTMCNC: 33.3 GM/DL
MCV RBC AUTO: 90.2 FL
NITRITE URINE: NEGATIVE
PH URINE: 6.5
PLATELET # BLD AUTO: 233 K/UL
POTASSIUM SERPL-SCNC: 4 MMOL/L
PROT SERPL-MCNC: 7.2 G/DL
PROT UR-MCNC: 14 MG/DL
PROTEIN URINE: 30 MG/DL
RBC # BLD: 4.49 M/UL
RBC # FLD: 12.8 %
SODIUM SERPL-SCNC: 138 MMOL/L
SPECIFIC GRAVITY URINE: 1.02
UROBILINOGEN URINE: 1 MG/DL
WBC # FLD AUTO: 4.12 K/UL

## 2024-09-13 LAB — DSDNA AB SER-ACNC: 1 IU/ML

## 2024-10-16 ENCOUNTER — NON-APPOINTMENT (OUTPATIENT)
Age: 49
End: 2024-10-16

## 2024-10-16 ENCOUNTER — APPOINTMENT (OUTPATIENT)
Dept: OPHTHALMOLOGY | Facility: CLINIC | Age: 49
End: 2024-10-16
Payer: COMMERCIAL

## 2024-10-16 PROCEDURE — 92012 INTRM OPH EXAM EST PATIENT: CPT | Mod: 25

## 2024-10-16 PROCEDURE — 68761 CLOSE TEAR DUCT OPENING: CPT | Mod: E2,E4

## 2025-01-15 ENCOUNTER — NON-APPOINTMENT (OUTPATIENT)
Age: 50
End: 2025-01-15

## 2025-01-15 ENCOUNTER — APPOINTMENT (OUTPATIENT)
Dept: OPHTHALMOLOGY | Facility: CLINIC | Age: 50
End: 2025-01-15
Payer: COMMERCIAL

## 2025-01-15 PROCEDURE — 92012 INTRM OPH EXAM EST PATIENT: CPT | Mod: 25

## 2025-01-15 PROCEDURE — 68761 CLOSE TEAR DUCT OPENING: CPT | Mod: E2,E4

## 2025-03-07 ENCOUNTER — RX RENEWAL (OUTPATIENT)
Age: 50
End: 2025-03-07

## 2025-04-29 ENCOUNTER — NON-APPOINTMENT (OUTPATIENT)
Age: 50
End: 2025-04-29

## 2025-04-30 ENCOUNTER — NON-APPOINTMENT (OUTPATIENT)
Age: 50
End: 2025-04-30

## 2025-04-30 ENCOUNTER — APPOINTMENT (OUTPATIENT)
Dept: OPHTHALMOLOGY | Facility: CLINIC | Age: 50
End: 2025-04-30
Payer: COMMERCIAL

## 2025-04-30 PROCEDURE — 68761 CLOSE TEAR DUCT OPENING: CPT | Mod: E2,E4

## 2025-04-30 PROCEDURE — 92133 CPTRZD OPH DX IMG PST SGM ON: CPT

## 2025-04-30 PROCEDURE — 92083 EXTENDED VISUAL FIELD XM: CPT

## 2025-04-30 PROCEDURE — 92014 COMPRE OPH EXAM EST PT 1/>: CPT | Mod: 25

## 2025-08-06 ENCOUNTER — APPOINTMENT (OUTPATIENT)
Dept: OPHTHALMOLOGY | Facility: CLINIC | Age: 50
End: 2025-08-06

## 2025-08-07 ENCOUNTER — APPOINTMENT (OUTPATIENT)
Dept: OPHTHALMOLOGY | Facility: CLINIC | Age: 50
End: 2025-08-07
Payer: COMMERCIAL

## 2025-08-07 ENCOUNTER — NON-APPOINTMENT (OUTPATIENT)
Age: 50
End: 2025-08-07

## 2025-08-07 PROCEDURE — 68761 CLOSE TEAR DUCT OPENING: CPT | Mod: E2,E4

## 2025-08-07 PROCEDURE — 92012 INTRM OPH EXAM EST PATIENT: CPT | Mod: 25

## 2025-09-01 ENCOUNTER — RX RENEWAL (OUTPATIENT)
Age: 50
End: 2025-09-01

## 2025-09-10 ENCOUNTER — APPOINTMENT (OUTPATIENT)
Dept: RHEUMATOLOGY | Facility: CLINIC | Age: 50
End: 2025-09-10
Payer: COMMERCIAL

## 2025-09-10 VITALS
HEIGHT: 60 IN | DIASTOLIC BLOOD PRESSURE: 85 MMHG | SYSTOLIC BLOOD PRESSURE: 114 MMHG | TEMPERATURE: 97.3 F | BODY MASS INDEX: 25.32 KG/M2 | WEIGHT: 129 LBS | OXYGEN SATURATION: 100 % | HEART RATE: 96 BPM

## 2025-09-10 DIAGNOSIS — Z79.899 OTHER LONG TERM (CURRENT) DRUG THERAPY: ICD-10-CM

## 2025-09-10 DIAGNOSIS — M25.50 PAIN IN UNSPECIFIED JOINT: ICD-10-CM

## 2025-09-10 DIAGNOSIS — M32.9 SYSTEMIC LUPUS ERYTHEMATOSUS, UNSPECIFIED: ICD-10-CM

## 2025-09-10 PROCEDURE — 99214 OFFICE O/P EST MOD 30 MIN: CPT

## 2025-09-10 PROCEDURE — G2211 COMPLEX E/M VISIT ADD ON: CPT | Mod: NC

## 2025-09-11 LAB
ALBUMIN SERPL ELPH-MCNC: 4.5 G/DL
ALP BLD-CCNC: 79 U/L
ALT SERPL-CCNC: 11 U/L
ANION GAP SERPL CALC-SCNC: 13 MMOL/L
APPEARANCE: CLEAR
AST SERPL-CCNC: 17 U/L
BASOPHILS # BLD AUTO: 0.05 K/UL
BASOPHILS NFR BLD AUTO: 1.4 %
BILIRUB SERPL-MCNC: 0.3 MG/DL
BILIRUBIN URINE: NEGATIVE
BLOOD URINE: NEGATIVE
BUN SERPL-MCNC: 8 MG/DL
CALCIUM SERPL-MCNC: 9.6 MG/DL
CHLORIDE SERPL-SCNC: 104 MMOL/L
CO2 SERPL-SCNC: 24 MMOL/L
COLOR: YELLOW
CREAT SERPL-MCNC: 0.75 MG/DL
CREAT SPEC-SCNC: 41 MG/DL
CREAT/PROT UR: 0.1 RATIO
CRP SERPL-MCNC: <3 MG/L
DSDNA AB SER-ACNC: 1 IU/ML
EGFRCR SERPLBLD CKD-EPI 2021: 98 ML/MIN/1.73M2
EOSINOPHIL # BLD AUTO: 0.13 K/UL
EOSINOPHIL NFR BLD AUTO: 3.5 %
ERYTHROCYTE [SEDIMENTATION RATE] IN BLOOD BY WESTERGREN METHOD: 19 MM/HR
GLUCOSE QUALITATIVE U: NEGATIVE MG/DL
GLUCOSE SERPL-MCNC: 99 MG/DL
HCT VFR BLD CALC: 33.2 %
HGB BLD-MCNC: 10.6 G/DL
IMM GRANULOCYTES NFR BLD AUTO: 0.3 %
KETONES URINE: NEGATIVE MG/DL
LEUKOCYTE ESTERASE URINE: NEGATIVE
LYMPHOCYTES # BLD AUTO: 1.26 K/UL
LYMPHOCYTES NFR BLD AUTO: 34.1 %
MAN DIFF?: NORMAL
MCHC RBC-ENTMCNC: 24.9 PG
MCHC RBC-ENTMCNC: 31.9 G/DL
MCV RBC AUTO: 77.9 FL
MONOCYTES # BLD AUTO: 0.44 K/UL
MONOCYTES NFR BLD AUTO: 11.9 %
NEUTROPHILS # BLD AUTO: 1.8 K/UL
NEUTROPHILS NFR BLD AUTO: 48.8 %
NITRITE URINE: NEGATIVE
PH URINE: 6.5
PLATELET # BLD AUTO: 287 K/UL
POTASSIUM SERPL-SCNC: 4 MMOL/L
PROT SERPL-MCNC: 7.4 G/DL
PROT UR-MCNC: 5 MG/DL
PROTEIN URINE: NEGATIVE MG/DL
RBC # BLD: 4.26 M/UL
RBC # FLD: 13.6 %
SODIUM SERPL-SCNC: 140 MMOL/L
SPECIFIC GRAVITY URINE: 1.01
UROBILINOGEN URINE: 0.2 MG/DL
WBC # FLD AUTO: 3.69 K/UL

## 2025-09-12 LAB
C3 SERPL-MCNC: 137 MG/DL
C4 SERPL-MCNC: 14 MG/DL